# Patient Record
Sex: MALE | Race: WHITE | NOT HISPANIC OR LATINO | Employment: FULL TIME | URBAN - METROPOLITAN AREA
[De-identification: names, ages, dates, MRNs, and addresses within clinical notes are randomized per-mention and may not be internally consistent; named-entity substitution may affect disease eponyms.]

---

## 2023-11-29 ENCOUNTER — APPOINTMENT (OUTPATIENT)
Dept: PRIMARY CARE | Facility: CLINIC | Age: 53
End: 2023-11-29
Payer: COMMERCIAL

## 2023-12-04 PROBLEM — R93.1 AGATSTON CORONARY ARTERY CALCIUM SCORE BETWEEN 200 AND 399: Status: ACTIVE | Noted: 2023-03-05

## 2023-12-04 PROBLEM — Z86.0100 HISTORY OF COLON POLYPS: Status: ACTIVE | Noted: 2019-05-24

## 2023-12-04 PROBLEM — F41.9 ANXIETY: Status: ACTIVE | Noted: 2019-11-12

## 2023-12-04 PROBLEM — E03.9 HYPOTHYROIDISM (ACQUIRED): Status: ACTIVE | Noted: 2019-06-03

## 2023-12-04 PROBLEM — Z86.010 HISTORY OF COLON POLYPS: Status: ACTIVE | Noted: 2019-05-24

## 2023-12-04 PROBLEM — J30.2 SEASONAL ALLERGIC RHINITIS: Status: ACTIVE | Noted: 2019-05-24

## 2023-12-04 PROBLEM — I10 BENIGN ESSENTIAL HYPERTENSION: Status: ACTIVE | Noted: 2019-05-24

## 2023-12-04 PROBLEM — K90.0 CELIAC DISEASE (HHS-HCC): Status: ACTIVE | Noted: 2019-05-24

## 2023-12-04 PROBLEM — I25.10 CORONARY ARTERIOSCLEROSIS: Status: ACTIVE | Noted: 2023-03-05

## 2023-12-04 PROBLEM — K76.0 HEPATIC STEATOSIS: Status: ACTIVE | Noted: 2019-08-21

## 2023-12-04 PROBLEM — R73.01 IMPAIRED FASTING GLUCOSE: Status: ACTIVE | Noted: 2019-06-03

## 2023-12-04 RX ORDER — LISINOPRIL 20 MG/1
20 TABLET ORAL DAILY
COMMUNITY
Start: 2022-12-22 | End: 2023-12-08 | Stop reason: SDUPTHER

## 2023-12-04 RX ORDER — AMLODIPINE BESYLATE 2.5 MG/1
2.5 TABLET ORAL DAILY
COMMUNITY
Start: 2023-01-04 | End: 2023-12-08 | Stop reason: SDUPTHER

## 2023-12-04 RX ORDER — ROSUVASTATIN CALCIUM 10 MG/1
1 TABLET, COATED ORAL NIGHTLY
COMMUNITY
Start: 2023-02-22

## 2023-12-04 RX ORDER — LEVOTHYROXINE SODIUM 88 UG/1
88 TABLET ORAL
COMMUNITY
Start: 2023-06-07

## 2023-12-04 RX ORDER — ASPIRIN 81 MG/1
81 TABLET ORAL DAILY
COMMUNITY

## 2023-12-06 ENCOUNTER — OFFICE VISIT (OUTPATIENT)
Dept: PRIMARY CARE | Facility: CLINIC | Age: 53
End: 2023-12-06
Payer: COMMERCIAL

## 2023-12-06 DIAGNOSIS — E03.9 HYPOTHYROIDISM (ACQUIRED): ICD-10-CM

## 2023-12-06 DIAGNOSIS — E78.2 MIXED HYPERLIPIDEMIA: ICD-10-CM

## 2023-12-06 DIAGNOSIS — R73.01 IMPAIRED FASTING GLUCOSE: ICD-10-CM

## 2023-12-06 DIAGNOSIS — I10 BENIGN ESSENTIAL HYPERTENSION: ICD-10-CM

## 2023-12-06 DIAGNOSIS — Z71.9 ENCOUNTER FOR COUNSELING: Primary | ICD-10-CM

## 2023-12-06 PROCEDURE — UHSMG PR UH SELECT MEET AND GREET: Performed by: INTERNAL MEDICINE

## 2023-12-06 NOTE — PROGRESS NOTES
Patient presents for meet/greet.    Routine labs that are currently due have been ordered and patient will complete if he pursues following in this practice.  Vaccines discussed and recommended    Office visit in the next 1-2 months recommended.    Guille Mccarthy MD

## 2023-12-08 ENCOUNTER — TELEPHONE (OUTPATIENT)
Dept: PRIMARY CARE | Facility: CLINIC | Age: 53
End: 2023-12-08
Payer: COMMERCIAL

## 2023-12-08 DIAGNOSIS — I10 BENIGN ESSENTIAL HYPERTENSION: ICD-10-CM

## 2023-12-08 DIAGNOSIS — Z00.00 WELLNESS EXAMINATION: Primary | ICD-10-CM

## 2023-12-08 RX ORDER — AMLODIPINE BESYLATE 2.5 MG/1
2.5 TABLET ORAL DAILY
Qty: 90 TABLET | Refills: 3 | Status: SHIPPED | OUTPATIENT
Start: 2023-12-08 | End: 2024-04-19

## 2023-12-08 RX ORDER — LISINOPRIL 20 MG/1
20 TABLET ORAL DAILY
Qty: 90 TABLET | Refills: 3 | Status: SHIPPED | OUTPATIENT
Start: 2023-12-08 | End: 2024-04-19

## 2023-12-08 NOTE — TELEPHONE ENCOUNTER
Patient to be scheduled for wellness exam/physical in early February  Fasting lab work has been ordered for completion prior to visit    Guille Mccarthy MD

## 2023-12-11 NOTE — TELEPHONE ENCOUNTER
Voicemail was left for Mr. Hebert to schedule his CPE.  He was also informed to have fasting labs drawn prior to his visit.

## 2024-02-08 ENCOUNTER — LAB (OUTPATIENT)
Dept: LAB | Facility: LAB | Age: 54
End: 2024-02-08
Payer: COMMERCIAL

## 2024-02-08 ENCOUNTER — TELEPHONE (OUTPATIENT)
Dept: PRIMARY CARE | Facility: CLINIC | Age: 54
End: 2024-02-08

## 2024-02-08 DIAGNOSIS — Z00.00 WELLNESS EXAMINATION: ICD-10-CM

## 2024-02-08 DIAGNOSIS — Z00.00 WELLNESS EXAMINATION: Primary | ICD-10-CM

## 2024-02-08 LAB
25(OH)D3 SERPL-MCNC: 33 NG/ML (ref 30–100)
ALBUMIN SERPL BCP-MCNC: 5 G/DL (ref 3.4–5)
ALP SERPL-CCNC: 62 U/L (ref 33–120)
ALT SERPL W P-5'-P-CCNC: 34 U/L (ref 10–52)
ANION GAP SERPL CALC-SCNC: 14 MMOL/L (ref 10–20)
APPEARANCE UR: CLEAR
AST SERPL W P-5'-P-CCNC: 27 U/L (ref 9–39)
BASOPHILS # BLD AUTO: 0.04 X10*3/UL (ref 0–0.1)
BASOPHILS NFR BLD AUTO: 0.7 %
BILIRUB SERPL-MCNC: 0.8 MG/DL (ref 0–1.2)
BILIRUB UR STRIP.AUTO-MCNC: NEGATIVE MG/DL
BUN SERPL-MCNC: 14 MG/DL (ref 6–23)
CALCIUM SERPL-MCNC: 10.5 MG/DL (ref 8.6–10.6)
CHLORIDE SERPL-SCNC: 98 MMOL/L (ref 98–107)
CHOLEST SERPL-MCNC: 175 MG/DL (ref 0–199)
CHOLESTEROL/HDL RATIO: 2.2
CO2 SERPL-SCNC: 30 MMOL/L (ref 21–32)
COLOR UR: COLORLESS
CREAT SERPL-MCNC: 0.84 MG/DL (ref 0.5–1.3)
CRP SERPL HS-MCNC: 1.6 MG/L
EGFRCR SERPLBLD CKD-EPI 2021: >90 ML/MIN/1.73M*2
EOSINOPHIL # BLD AUTO: 0.07 X10*3/UL (ref 0–0.7)
EOSINOPHIL NFR BLD AUTO: 1.2 %
ERYTHROCYTE [DISTWIDTH] IN BLOOD BY AUTOMATED COUNT: 12.1 % (ref 11.5–14.5)
EST. AVERAGE GLUCOSE BLD GHB EST-MCNC: 100 MG/DL
GLUCOSE SERPL-MCNC: 86 MG/DL (ref 74–99)
GLUCOSE UR STRIP.AUTO-MCNC: NORMAL MG/DL
HBA1C MFR BLD: 5.1 %
HCT VFR BLD AUTO: 47.1 % (ref 41–52)
HDLC SERPL-MCNC: 78.6 MG/DL
HGB BLD-MCNC: 15.8 G/DL (ref 13.5–17.5)
IMM GRANULOCYTES # BLD AUTO: 0.03 X10*3/UL (ref 0–0.7)
IMM GRANULOCYTES NFR BLD AUTO: 0.5 % (ref 0–0.9)
KETONES UR STRIP.AUTO-MCNC: NEGATIVE MG/DL
LDLC SERPL CALC-MCNC: 79 MG/DL
LEUKOCYTE ESTERASE UR QL STRIP.AUTO: NEGATIVE
LYMPHOCYTES # BLD AUTO: 1.9 X10*3/UL (ref 1.2–4.8)
LYMPHOCYTES NFR BLD AUTO: 32.9 %
MCH RBC QN AUTO: 31.5 PG (ref 26–34)
MCHC RBC AUTO-ENTMCNC: 33.5 G/DL (ref 32–36)
MCV RBC AUTO: 94 FL (ref 80–100)
MONOCYTES # BLD AUTO: 0.52 X10*3/UL (ref 0.1–1)
MONOCYTES NFR BLD AUTO: 9 %
NEUTROPHILS # BLD AUTO: 3.22 X10*3/UL (ref 1.2–7.7)
NEUTROPHILS NFR BLD AUTO: 55.7 %
NITRITE UR QL STRIP.AUTO: NEGATIVE
NON HDL CHOLESTEROL: 96 MG/DL (ref 0–149)
NRBC BLD-RTO: 0 /100 WBCS (ref 0–0)
PH UR STRIP.AUTO: 6.5 [PH]
PLATELET # BLD AUTO: 230 X10*3/UL (ref 150–450)
POTASSIUM SERPL-SCNC: 4.5 MMOL/L (ref 3.5–5.3)
PROT SERPL-MCNC: 8 G/DL (ref 6.4–8.2)
PROT UR STRIP.AUTO-MCNC: NEGATIVE MG/DL
PSA SERPL-MCNC: 1.4 NG/ML
RBC # BLD AUTO: 5.01 X10*6/UL (ref 4.5–5.9)
RBC # UR STRIP.AUTO: NEGATIVE /UL
SODIUM SERPL-SCNC: 137 MMOL/L (ref 136–145)
SP GR UR STRIP.AUTO: 1
TRIGL SERPL-MCNC: 88 MG/DL (ref 0–149)
TSH SERPL-ACNC: 3.27 MIU/L (ref 0.44–3.98)
UROBILINOGEN UR STRIP.AUTO-MCNC: NORMAL MG/DL
VLDL: 18 MG/DL (ref 0–40)
WBC # BLD AUTO: 5.8 X10*3/UL (ref 4.4–11.3)

## 2024-02-08 PROCEDURE — 80053 COMPREHEN METABOLIC PANEL: CPT

## 2024-02-08 PROCEDURE — 86141 C-REACTIVE PROTEIN HS: CPT

## 2024-02-08 PROCEDURE — 81003 URINALYSIS AUTO W/O SCOPE: CPT

## 2024-02-08 PROCEDURE — 83036 HEMOGLOBIN GLYCOSYLATED A1C: CPT

## 2024-02-08 PROCEDURE — 84153 ASSAY OF PSA TOTAL: CPT

## 2024-02-08 PROCEDURE — 36415 COLL VENOUS BLD VENIPUNCTURE: CPT

## 2024-02-08 PROCEDURE — 80061 LIPID PANEL: CPT

## 2024-02-08 PROCEDURE — 84443 ASSAY THYROID STIM HORMONE: CPT

## 2024-02-08 PROCEDURE — 82306 VITAMIN D 25 HYDROXY: CPT

## 2024-02-08 PROCEDURE — 85025 COMPLETE CBC W/AUTO DIFF WBC: CPT

## 2024-02-08 NOTE — TELEPHONE ENCOUNTER
Gilberto had labs drawn today.  He mentioned that a lipid panel wasn't ordered.  Should this be added on?  Last lipid panel was on 5-9-23.

## 2024-02-09 ENCOUNTER — APPOINTMENT (OUTPATIENT)
Dept: PRIMARY CARE | Facility: CLINIC | Age: 54
End: 2024-02-09
Payer: COMMERCIAL

## 2024-02-12 ENCOUNTER — OFFICE VISIT (OUTPATIENT)
Dept: PRIMARY CARE | Facility: CLINIC | Age: 54
End: 2024-02-12
Payer: COMMERCIAL

## 2024-02-12 VITALS
DIASTOLIC BLOOD PRESSURE: 80 MMHG | HEART RATE: 84 BPM | SYSTOLIC BLOOD PRESSURE: 124 MMHG | OXYGEN SATURATION: 96 % | BODY MASS INDEX: 33.32 KG/M2 | HEIGHT: 71 IN | WEIGHT: 238 LBS

## 2024-02-12 DIAGNOSIS — I25.10 CORONARY ARTERIOSCLEROSIS: ICD-10-CM

## 2024-02-12 DIAGNOSIS — J30.2 SEASONAL ALLERGIC RHINITIS, UNSPECIFIED TRIGGER: ICD-10-CM

## 2024-02-12 DIAGNOSIS — E66.09 CLASS 1 OBESITY DUE TO EXCESS CALORIES WITHOUT SERIOUS COMORBIDITY WITH BODY MASS INDEX (BMI) OF 33.0 TO 33.9 IN ADULT: ICD-10-CM

## 2024-02-12 DIAGNOSIS — Z00.00 WELLNESS EXAMINATION: Primary | ICD-10-CM

## 2024-02-12 DIAGNOSIS — E78.2 MIXED HYPERLIPIDEMIA: ICD-10-CM

## 2024-02-12 DIAGNOSIS — Z78.9 ADEQUATE NUTRITION: ICD-10-CM

## 2024-02-12 DIAGNOSIS — Z23 NEED FOR VACCINATION: ICD-10-CM

## 2024-02-12 DIAGNOSIS — E03.9 HYPOTHYROIDISM (ACQUIRED): ICD-10-CM

## 2024-02-12 DIAGNOSIS — Z80.0 FAMILY HISTORY OF COLON CANCER: ICD-10-CM

## 2024-02-12 DIAGNOSIS — I10 ESSENTIAL HYPERTENSION: ICD-10-CM

## 2024-02-12 PROBLEM — R73.01 IMPAIRED FASTING GLUCOSE: Status: RESOLVED | Noted: 2019-06-03 | Resolved: 2024-02-12

## 2024-02-12 PROBLEM — E66.9 CLASS 1 OBESITY WITHOUT SERIOUS COMORBIDITY IN ADULT: Status: ACTIVE | Noted: 2024-02-12

## 2024-02-12 PROBLEM — E66.811 CLASS 1 OBESITY WITHOUT SERIOUS COMORBIDITY IN ADULT: Status: ACTIVE | Noted: 2024-02-12

## 2024-02-12 PROCEDURE — 3079F DIAST BP 80-89 MM HG: CPT | Performed by: INTERNAL MEDICINE

## 2024-02-12 PROCEDURE — 3074F SYST BP LT 130 MM HG: CPT | Performed by: INTERNAL MEDICINE

## 2024-02-12 PROCEDURE — 90471 IMMUNIZATION ADMIN: CPT | Performed by: INTERNAL MEDICINE

## 2024-02-12 PROCEDURE — 1036F TOBACCO NON-USER: CPT | Performed by: INTERNAL MEDICINE

## 2024-02-12 PROCEDURE — 90686 IIV4 VACC NO PRSV 0.5 ML IM: CPT | Performed by: INTERNAL MEDICINE

## 2024-02-12 PROCEDURE — UHSPHYS PR UH SELECT PHYSICAL: Performed by: INTERNAL MEDICINE

## 2024-02-12 RX ORDER — BISMUTH SUBSALICYLATE 262 MG
1 TABLET,CHEWABLE ORAL DAILY
Qty: 90 TABLET | Refills: 3
Start: 2024-02-12 | End: 2025-02-11

## 2024-02-12 ASSESSMENT — ENCOUNTER SYMPTOMS
RHINORRHEA: 0
DIARRHEA: 0
ABDOMINAL PAIN: 0
HEADACHES: 0
PALPITATIONS: 0
DYSURIA: 0
COUGH: 0
WEAKNESS: 0
LIGHT-HEADEDNESS: 0
ARTHRALGIAS: 0
DIZZINESS: 0
FATIGUE: 0
SHORTNESS OF BREATH: 0
BACK PAIN: 0
CONSTIPATION: 0
WHEEZING: 0

## 2024-02-12 NOTE — PROGRESS NOTES
Subjective   Patient ID: Gilberto Hebert is a 53 y.o. male who presents for Annual Exam.    Wellness exam/physical    Essential hypertension  Home blood pressure readings reported as 120/70-85 (no BP log available for review)  Compliant with amlodipine and lisinopril.  Getting regular exercise.  Moderate alcohol intake averaging 2-3 drinks per day.    Medical obesity (class I, BMI 33.19)  Getting regular exercise, diet optimization discussed.  Limiting alcohol reviewed.  Referral to nutrition discussed and patient agreeable.    Coronary arteriosclerosis  Hyperlipidemia  Active lifestyle without chest pain, shortness of breath, etc.    Hypothyroidism  TSH at goal.  No unintentional weight gain or loss, constipation or diarrhea, heat or cold intolerance.  Currently on levothyroxine 88 mcg daily    Seasonal allergic rhinitis  Mild symptoms, usually in the spring.  Has not recently used any medications.    Family history of colon cancer  Last colonoscopy 2/2021 at which time hyperplastic polyp removed.  Next colonoscopy due in 2/2026 with Dr. Sampson    Health maintenance  Exercise: Walking 30 to 40 minutes daily and tennis twice a week  Colon cancer screening: Up-to-date  Optometry: Upcoming appointment on 2/14  Dentistry: Up-to-date    Social  Occupation: Financial advising  Education: [employer] for undergraduate, [employer] law school   with children  Vacation home in [place]    Follow-up  1.  Office visit in August  (Lab work including BMP and TSH to be completed prior)  2.  Wellness exam/physical in 1 year, on/after 2/12/2025           Review of Systems   Constitutional:  Negative for fatigue.   HENT:  Negative for postnasal drip and rhinorrhea.    Respiratory:  Negative for cough, shortness of breath and wheezing.    Cardiovascular:  Negative for chest pain, palpitations and leg swelling.   Gastrointestinal:  Negative for abdominal pain, constipation and diarrhea.   Genitourinary:  Negative for dysuria and  "urgency.   Musculoskeletal:  Negative for arthralgias and back pain.   Skin:  Negative for rash.   Allergic/Immunologic: Positive for environmental allergies.   Neurological:  Negative for dizziness, weakness, light-headedness and headaches.       Objective   /80 (BP Location: Left arm, Patient Position: Sitting, BP Cuff Size: Adult)   Pulse 84   Ht 1.803 m (5' 11\")   Wt 108 kg (238 lb)   SpO2 96%   BMI 33.19 kg/m²     Physical Exam  Vitals reviewed.   Constitutional:       Appearance: Normal appearance.   HENT:      Head: Normocephalic.      Right Ear: Tympanic membrane normal.      Left Ear: Tympanic membrane normal.      Mouth/Throat:      Mouth: Mucous membranes are moist.   Eyes:      Conjunctiva/sclera: Conjunctivae normal.      Pupils: Pupils are equal, round, and reactive to light.   Neck:      Vascular: No carotid bruit.   Cardiovascular:      Rate and Rhythm: Normal rate and regular rhythm.   Pulmonary:      Effort: Pulmonary effort is normal.      Breath sounds: Normal breath sounds. No rales.   Abdominal:      General: Abdomen is flat. Bowel sounds are normal.      Tenderness: There is no abdominal tenderness.   Genitourinary:     Prostate: Normal.   Musculoskeletal:      Right lower leg: No edema.      Left lower leg: No edema.   Lymphadenopathy:      Cervical: No cervical adenopathy.   Skin:     General: Skin is warm.   Neurological:      General: No focal deficit present.      Mental Status: He is alert and oriented to person, place, and time.   Psychiatric:         Mood and Affect: Mood normal.         Assessment/Plan     Wellness exam/physical  Regular exercise with goal of 120-150 minutes/week recommended  Well-balanced diet rich in fruits, vegetables, fiber, lean protein recommended  Influenza vaccine given today  Other vaccines discussed include:  Second Shingrix vaccine and COVID-19 vaccine (patient plans to receive at pharmacy)  Continued routine follow-up with optometry (appointment " scheduled on 2/14) and dentistry recommended    Essential hypertension  Stable.  Continue amlodipine 2.5 mg daily and lisinopril 20 mg daily  Low-salt and Mediterranean-type diet recommended  Regular exercise and limiting alcohol intake recommended  Continue home blood pressure monitoring.  Bring readings and device to next visit  Goal for blood pressure is under 130/80    Medical obesity (class I, BMI 33.19)  Low carbohydrate/low sugar diet, limited alcohol intake, and regular exercise recommended  Weight loss of 25-30 pounds recommended  Referral to nutrition for consultation    Coronary arteriosclerosis  Hyperlipidemia  Regular exercise, low-fat/high-fiber Mediterranean type diet recommended  Continue rosuvastatin 10 mg daily and aspirin 81 mg daily    Hypothyroidism  Continue levothyroxine 88 mcg daily    Seasonal allergic rhinitis  Over-the-counter remedies such as Zyrtec 10 mg daily or Flonase 2 sprays in each nostril daily as needed can be used    Family history of colon cancer  Next screening colonoscopy due in 2/2026    Follow-up  1.  Office visit in August  (Lab work including BMP and TSH to be completed prior)  2.  Wellness exam/physical in 1 year, on/after 2/12/2025    Guille Mccarthy MD

## 2024-02-12 NOTE — PATIENT INSTRUCTIONS
Wellness exam/physical  Regular exercise with goal of 120-150 minutes/week recommended  Well-balanced diet rich in fruits, vegetables, fiber, lean protein recommended  Influenza vaccine given today  Other vaccines discussed include:  Second Shingrix vaccine and COVID-19 vaccine (patient plans to receive at pharmacy)  Continued routine follow-up with optometry (appointment scheduled on 2/14) and dentistry recommended    Essential hypertension  Stable.  Continue amlodipine 2.5 mg daily and lisinopril 20 mg daily  Low-salt and Mediterranean-type diet recommended  Regular exercise and limiting alcohol intake recommended  Continue home blood pressure monitoring.  Bring readings and device to next visit  Goal for blood pressure is under 130/80    Medical obesity (class I, BMI 33.19)  Low carbohydrate/low sugar diet, limited alcohol intake, and regular exercise recommended  Weight loss of 25-30 pounds recommended  Referral to nutrition for consultation    Coronary arteriosclerosis  Hyperlipidemia  Regular exercise, low-fat/high-fiber Mediterranean type diet recommended  Continue rosuvastatin 10 mg daily and aspirin 81 mg daily    Hypothyroidism  Continue levothyroxine 88 mcg daily    Seasonal allergic rhinitis  Over-the-counter remedies such as Zyrtec 10 mg daily or Flonase 2 sprays in each nostril daily as needed can be used    Family history of colon cancer  Next screening colonoscopy due in 2/2026    Follow-up  1.  Office visit in August  (Lab work including BMP and TSH to be completed prior)  2.  Wellness exam/physical in 1 year, on/after 2/12/2025

## 2024-04-12 ENCOUNTER — TELEMEDICINE CLINICAL SUPPORT (OUTPATIENT)
Dept: PRIMARY CARE | Facility: CLINIC | Age: 54
End: 2024-04-12
Payer: COMMERCIAL

## 2024-04-12 DIAGNOSIS — E78.2 MIXED HYPERLIPIDEMIA: ICD-10-CM

## 2024-04-12 DIAGNOSIS — E66.09 CLASS 1 OBESITY DUE TO EXCESS CALORIES WITHOUT SERIOUS COMORBIDITY WITH BODY MASS INDEX (BMI) OF 33.0 TO 33.9 IN ADULT: ICD-10-CM

## 2024-04-12 PROCEDURE — NUTCO NUTRITION CONSULTATION: Performed by: DIETITIAN, REGISTERED

## 2024-04-12 NOTE — PROGRESS NOTES
Reason for Nutrition Visit:  It was a pleasure meeting Mr. Hebert today to discuss diet and nutrition as part of the  [facility] program.    1. Mixed hyperlipidemia  Referral to Nutrition Services      2. Class 1 obesity due to excess calories without serious comorbidity with body mass index (BMI) of 33.0 to 33.9 in adult  Referral to Nutrition Services           Medication Documentation Review Audit       Reviewed by Kiara Shields CMA (Medical Assistant) on 02/12/24 at 1013      Medication Order Taking? Sig Documenting Provider Last Dose Status   amLODIPine (Norvasc) 2.5 mg tablet [id number] Yes Take 1 tablet (2.5 mg) by mouth once daily. Guille Mccarthy MD Taking Active   aspirin 81 mg EC tablet [id number] Yes Take 1 tablet (81 mg) by mouth once daily. Historical Provider, MD Taking Active   levothyroxine (Synthroid, Levoxyl) 88 mcg tablet [id number] Yes Take 1 tablet (88 mcg) by mouth once daily in the morning. Take before meals. Historical Provider, MD Taking Active   lisinopril 20 mg tablet [id number] Yes Take 1 tablet (20 mg) by mouth once daily. Guille Mccarthy MD Taking Active   rosuvastatin (Crestor) 10 mg tablet [id number] Yes Take 1 tablet (10 mg) by mouth once daily at bedtime. Historical Provider, MD Taking Active                     Past Medical Hx:  Patient Active Problem List   Diagnosis    Anxiety    Essential hypertension    Celiac disease (HHS-HCC)    Agatston coronary artery calcium score between 200 and 399    Coronary arteriosclerosis    Hepatic steatosis    History of colon polyps    Mixed hyperlipidemia    Hypothyroidism (acquired)    Seasonal allergic rhinitis    Class 1 obesity without serious comorbidity with body mass index (BMI) of 33.0 to 33.9 in adult    Family history of colon cancer        Weight change:  His current weight is 238lbs.  He has been at this weight for the past 5 years.  He would like to lost ~20lbs and get his weight closer to 220lbs.    Significant Weight Change:  No    Lab Results   Component Value Date    HGBA1C 5.1 02/08/2024    CHOL 175 02/08/2024    LDLCALC 79 02/08/2024    TRIG 88 02/08/2024    HDL 78.6 02/08/2024        Food and Nutrition Hx:  Reports eating 3 meals a day.  He will snack on almonds and string cheese in the afternoons.  He eats Cheerios and 2 servings of fruit for breakfast, such as orange and banana.  He works from home, but eats out for lunch frequently about 3 times a week and he will eat out for dinner twice a week.  He is eating two servings of fish a week.      24 Diet Recall:  Breakfast: Cheerios with skim milk, tangerine, banana  Lunch: turkey and Swiss cheese roll ups, corn chips, tangerine  Snack: almonds (10-12)  Dinner: salmon (8oz), spinach salad with strawberries, almonds, mushrooms, onions, Italian dressing    Beverages: water-100-120oz a day; alcohol-2-3 drinks a night, vodka soda    Allergies: None  Intolerance: Gluten He avoids gluten.  As a child he was diagnosed with celiac.      GI Symptoms : None He has a bowel movement everyday.    Exercise: Walking 30 minutes everyday (7 days a week); tennis twice a week (plays more when he is in [place]).  He does not incorporate strength training.     Sleep duration/quality : 7 hours a night.  No issues falling asleep.  Occasionally he will get up once to use the bathroom, but he falls back to sleep easily.     Supplements: Multivitamin daily    Cravings: None  Energy Levels: High    Estimated Nutrient Needs:    Calories for Weight Maintenance: 2527 ([place] [address] Jeor x 1.3 activity factor)  Calories for Weight Loss: 2000 calories a day  Protein Needs: 154g/day (0.7g/lb DBW, 220lbs)    Nutrition Diagnosis:    Diagnosis Statement 1:  Diagnosis Status: New  Diagnosis : Obese related to  food- and nutrition-related knowledge deficit  as evidenced by  BMI of 33, reports of consuming some high calorie foods and large portions at times.    Diagnosis Statement 2:  Diagnosis Status: New  Diagnosis :  Inadequate fiber intake  related to food and nutrition related knowledge deficit concerning desirable quantities of fiber as evidenced by  food recall showing he is not meeting the recommended 35-40g fiber per day    Diagnosis Statement 3:   Diagnosis Status: New  Diagnosis : Inadequate protein intake  related to  food- and nutrition-related knowledge deficit regarding appropriate protein intake  as evidenced by  food recall showing he is not meeting the recommended 150g protein per day    Nutrition Interventions:  Decreased Carbohydrate Diet, Increased Fiber Diet, and Increased Protein Diet      Nutrition Goals:  Nutrition Goals : Initiate Exercise Regimen  Reduce Kcal Intake  Weight Loss  Adequate protein intake  Adequate fiber intake    Nutrition Recommendations:    1) To promote weight loss, which should also keep lipid profile and glucose tolerance in line, try to stick to a 4109-0908 calorie a day meal plan.  Begin to track your food intake using an timbo such as My Fitness Pal, Zazzy, or [facility], paying specific attention to calories, protein, and fiber.     2) To help create well balanced meals while being mindful of portion sizes, use the plate model for portioning out your meals.  Fill 1/2 of your plate with non-starchy vegetables, 1/4 of your plate with lean protein (~6-7oz per meal), and 1/4 of your plate with complex carbohydrates/whole grains.  Each meal should contain the following 3 components: protein + fiber + healthy fats.    3) Aim for 35-40g fiber daily.  One way to help you reach this goal is to include non-starchy vegetables with both lunch and dinner (at least 1-2 cups).  Be sure to include a wide variety of colorful vegetables throughout the week.  Also, be sure to use brown/wild rice, quinoa, oats, beans, lentils, starchy vegetables-potatoes, sweet potatoes, corn, peas, winter squash and limit/avoid white, processed carbohydrates (white bread, white pasta, white rice, etc).    4) Choose  3 out of 5 of the following daily to help you reach your daily fiber goals:  -1 cup berries (4-5g fiber)  -1/2 cup beans (7g fiber)  -1/4 cup nuts (5g fiber)  -1/3 avocado (4g fiber)  -3 cups greens (5g fiber)    5) Ensure you are getting adequate amounts of protein consistently throughout the day.  Your daily protein goal is 150g.  Aim for 40-50g per meal and include 10-15g protein at snacks.    6) Options for breakfast to help you increase protein and fiber at this meal:  -low-fat cottage cheese + fruit + handful of nuts  -Greek yogurt + Brittnee Crunch cereal + fruit + nuts  -OWYN Pro Elite pre-made protein drink + fruit + nuts  -oatmeal + 1.5-2 scoops protein powder (recommended brand: [employer] Sonicsuvani plant protein powder) + fruit + nuts/seeds  -2-3 eggs + chicken sausage + veggies + avocado    7) High quality protein bars to keep on hand for a snack option: Aloha, No Cow    8) Incorporate a Vitamin D3 supplement daily, 2000IUs. Take this with food to enhance absorption.    9) Incorporate strength training 2-3 days a week.

## 2024-04-19 DIAGNOSIS — I10 BENIGN ESSENTIAL HYPERTENSION: ICD-10-CM

## 2024-04-19 RX ORDER — LISINOPRIL 20 MG/1
20 TABLET ORAL DAILY
Qty: 90 TABLET | Refills: 3 | Status: SHIPPED | OUTPATIENT
Start: 2024-04-19

## 2024-04-19 RX ORDER — AMLODIPINE BESYLATE 2.5 MG/1
2.5 TABLET ORAL DAILY
Qty: 90 TABLET | Refills: 3 | Status: SHIPPED | OUTPATIENT
Start: 2024-04-19

## 2024-06-06 ENCOUNTER — TELEPHONE (OUTPATIENT)
Dept: PRIMARY CARE | Facility: CLINIC | Age: 54
End: 2024-06-06
Payer: COMMERCIAL

## 2024-06-06 ENCOUNTER — APPOINTMENT (OUTPATIENT)
Dept: RADIOLOGY | Facility: HOSPITAL | Age: 54
End: 2024-06-06
Payer: COMMERCIAL

## 2024-06-06 ENCOUNTER — HOSPITAL ENCOUNTER (EMERGENCY)
Facility: HOSPITAL | Age: 54
Discharge: HOME | End: 2024-06-06
Attending: INTERNAL MEDICINE
Payer: COMMERCIAL

## 2024-06-06 ENCOUNTER — APPOINTMENT (OUTPATIENT)
Dept: CARDIOLOGY | Facility: HOSPITAL | Age: 54
End: 2024-06-06
Payer: COMMERCIAL

## 2024-06-06 VITALS
HEIGHT: 71 IN | RESPIRATION RATE: 18 BRPM | SYSTOLIC BLOOD PRESSURE: 132 MMHG | OXYGEN SATURATION: 100 % | DIASTOLIC BLOOD PRESSURE: 78 MMHG | HEART RATE: 82 BPM | TEMPERATURE: 98.1 F | WEIGHT: 234 LBS | BODY MASS INDEX: 32.76 KG/M2

## 2024-06-06 DIAGNOSIS — I15.9 SECONDARY HYPERTENSION: Primary | ICD-10-CM

## 2024-06-06 DIAGNOSIS — F10.939 ALCOHOL WITHDRAWAL SYNDROME WITH COMPLICATION (MULTI): ICD-10-CM

## 2024-06-06 DIAGNOSIS — R25.1 TREMOR: ICD-10-CM

## 2024-06-06 LAB
ALBUMIN SERPL BCP-MCNC: 5 G/DL (ref 3.4–5)
ALP SERPL-CCNC: 67 U/L (ref 33–120)
ALT SERPL W P-5'-P-CCNC: 44 U/L (ref 10–52)
ANION GAP SERPL CALC-SCNC: 16 MMOL/L (ref 10–20)
AST SERPL W P-5'-P-CCNC: 31 U/L (ref 9–39)
BASOPHILS # BLD AUTO: 0.03 X10*3/UL (ref 0–0.1)
BASOPHILS NFR BLD AUTO: 0.4 %
BILIRUB SERPL-MCNC: 0.8 MG/DL (ref 0–1.2)
BNP SERPL-MCNC: 15 PG/ML (ref 0–99)
BUN SERPL-MCNC: 13 MG/DL (ref 6–23)
CALCIUM SERPL-MCNC: 9.6 MG/DL (ref 8.6–10.3)
CARDIAC TROPONIN I PNL SERPL HS: 3 NG/L (ref 0–20)
CARDIAC TROPONIN I PNL SERPL HS: <3 NG/L (ref 0–20)
CHLORIDE SERPL-SCNC: 99 MMOL/L (ref 98–107)
CO2 SERPL-SCNC: 23 MMOL/L (ref 21–32)
CREAT SERPL-MCNC: 0.78 MG/DL (ref 0.5–1.3)
EGFRCR SERPLBLD CKD-EPI 2021: >90 ML/MIN/1.73M*2
EOSINOPHIL # BLD AUTO: 0.05 X10*3/UL (ref 0–0.7)
EOSINOPHIL NFR BLD AUTO: 0.7 %
ERYTHROCYTE [DISTWIDTH] IN BLOOD BY AUTOMATED COUNT: 11.9 % (ref 11.5–14.5)
GLUCOSE SERPL-MCNC: 100 MG/DL (ref 74–99)
HCT VFR BLD AUTO: 48.4 % (ref 41–52)
HGB BLD-MCNC: 16.5 G/DL (ref 13.5–17.5)
IMM GRANULOCYTES # BLD AUTO: 0.03 X10*3/UL (ref 0–0.7)
IMM GRANULOCYTES NFR BLD AUTO: 0.4 % (ref 0–0.9)
LYMPHOCYTES # BLD AUTO: 1.86 X10*3/UL (ref 1.2–4.8)
LYMPHOCYTES NFR BLD AUTO: 25.2 %
MAGNESIUM SERPL-MCNC: 2.1 MG/DL (ref 1.6–2.4)
MCH RBC QN AUTO: 31.6 PG (ref 26–34)
MCHC RBC AUTO-ENTMCNC: 34.1 G/DL (ref 32–36)
MCV RBC AUTO: 93 FL (ref 80–100)
MONOCYTES # BLD AUTO: 0.67 X10*3/UL (ref 0.1–1)
MONOCYTES NFR BLD AUTO: 9.1 %
NEUTROPHILS # BLD AUTO: 4.73 X10*3/UL (ref 1.2–7.7)
NEUTROPHILS NFR BLD AUTO: 64.2 %
NRBC BLD-RTO: 0 /100 WBCS (ref 0–0)
PLATELET # BLD AUTO: 228 X10*3/UL (ref 150–450)
POTASSIUM SERPL-SCNC: 4.2 MMOL/L (ref 3.5–5.3)
PROT SERPL-MCNC: 7.8 G/DL (ref 6.4–8.2)
RBC # BLD AUTO: 5.22 X10*6/UL (ref 4.5–5.9)
SODIUM SERPL-SCNC: 134 MMOL/L (ref 136–145)
TSH SERPL-ACNC: 3.24 MIU/L (ref 0.44–3.98)
WBC # BLD AUTO: 7.4 X10*3/UL (ref 4.4–11.3)

## 2024-06-06 PROCEDURE — 36415 COLL VENOUS BLD VENIPUNCTURE: CPT | Performed by: INTERNAL MEDICINE

## 2024-06-06 PROCEDURE — 84484 ASSAY OF TROPONIN QUANT: CPT | Performed by: INTERNAL MEDICINE

## 2024-06-06 PROCEDURE — 71046 X-RAY EXAM CHEST 2 VIEWS: CPT

## 2024-06-06 PROCEDURE — 80053 COMPREHEN METABOLIC PANEL: CPT | Performed by: INTERNAL MEDICINE

## 2024-06-06 PROCEDURE — 2500000004 HC RX 250 GENERAL PHARMACY W/ HCPCS (ALT 636 FOR OP/ED): Performed by: INTERNAL MEDICINE

## 2024-06-06 PROCEDURE — 84443 ASSAY THYROID STIM HORMONE: CPT | Performed by: INTERNAL MEDICINE

## 2024-06-06 PROCEDURE — 99283 EMERGENCY DEPT VISIT LOW MDM: CPT | Mod: 25

## 2024-06-06 PROCEDURE — 85025 COMPLETE CBC W/AUTO DIFF WBC: CPT | Performed by: INTERNAL MEDICINE

## 2024-06-06 PROCEDURE — 93005 ELECTROCARDIOGRAM TRACING: CPT

## 2024-06-06 PROCEDURE — 71046 X-RAY EXAM CHEST 2 VIEWS: CPT | Performed by: RADIOLOGY

## 2024-06-06 PROCEDURE — 2500000002 HC RX 250 W HCPCS SELF ADMINISTERED DRUGS (ALT 637 FOR MEDICARE OP, ALT 636 FOR OP/ED): Performed by: INTERNAL MEDICINE

## 2024-06-06 PROCEDURE — 83735 ASSAY OF MAGNESIUM: CPT | Performed by: INTERNAL MEDICINE

## 2024-06-06 PROCEDURE — 83880 ASSAY OF NATRIURETIC PEPTIDE: CPT | Performed by: INTERNAL MEDICINE

## 2024-06-06 RX ORDER — DIAZEPAM 5 MG/1
10 TABLET ORAL EVERY 2 HOUR PRN
Status: DISCONTINUED | OUTPATIENT
Start: 2024-06-06 | End: 2024-06-06 | Stop reason: HOSPADM

## 2024-06-06 RX ORDER — DIAZEPAM 5 MG/1
10 TABLET ORAL ONCE
Status: COMPLETED | OUTPATIENT
Start: 2024-06-06 | End: 2024-06-06

## 2024-06-06 RX ADMIN — SODIUM CHLORIDE 1000 ML: 9 INJECTION, SOLUTION INTRAVENOUS at 17:12

## 2024-06-06 RX ADMIN — DIAZEPAM 10 MG: 5 TABLET ORAL at 16:19

## 2024-06-06 ASSESSMENT — LIFESTYLE VARIABLES
NAUSEA AND VOMITING: NO NAUSEA AND NO VOMITING
ORIENTATION AND CLOUDING OF SENSORIUM: ORIENTED AND CAN DO SERIAL ADDITIONS
ANXIETY: MILDLY ANXIOUS
AUDITORY DISTURBANCES: NOT PRESENT
ORIENTATION AND CLOUDING OF SENSORIUM: ORIENTED AND CAN DO SERIAL ADDITIONS
HEADACHE, FULLNESS IN HEAD: NOT PRESENT
TREMOR: 2
BLOOD PRESSURE: 126/89
PAROXYSMAL SWEATS: BARELY PERCEPTIBLE SWEATING, PALMS MOIST
TOTAL SCORE: 1
VISUAL DISTURBANCES: NOT PRESENT
TOTAL SCORE: 3
PULSE: 77
ANXIETY: MILDLY ANXIOUS
PAROXYSMAL SWEATS: NO SWEAT VISIBLE
AUDITORY DISTURBANCES: NOT PRESENT
AUDITORY DISTURBANCES: NOT PRESENT
NAUSEA AND VOMITING: NO NAUSEA AND NO VOMITING
VISUAL DISTURBANCES: NOT PRESENT
HEADACHE, FULLNESS IN HEAD: NOT PRESENT
PULSE: 79
ANXIETY: NO ANXIETY, AT EASE
TOTAL SCORE: 4
TREMOR: 2
HEADACHE, FULLNESS IN HEAD: NOT PRESENT
PAROXYSMAL SWEATS: BARELY PERCEPTIBLE SWEATING, PALMS MOIST
ORIENTATION AND CLOUDING OF SENSORIUM: ORIENTED AND CAN DO SERIAL ADDITIONS
VISUAL DISTURBANCES: NOT PRESENT
NAUSEA AND VOMITING: NO NAUSEA AND NO VOMITING
TREMOR: NO TREMOR
AGITATION: NORMAL ACTIVITY

## 2024-06-06 ASSESSMENT — COLUMBIA-SUICIDE SEVERITY RATING SCALE - C-SSRS
2. HAVE YOU ACTUALLY HAD ANY THOUGHTS OF KILLING YOURSELF?: NO
6. HAVE YOU EVER DONE ANYTHING, STARTED TO DO ANYTHING, OR PREPARED TO DO ANYTHING TO END YOUR LIFE?: NO
1. IN THE PAST MONTH, HAVE YOU WISHED YOU WERE DEAD OR WISHED YOU COULD GO TO SLEEP AND NOT WAKE UP?: NO

## 2024-06-06 ASSESSMENT — PAIN SCALES - GENERAL
PAINLEVEL_OUTOF10: 0 - NO PAIN

## 2024-06-06 ASSESSMENT — PAIN - FUNCTIONAL ASSESSMENT
PAIN_FUNCTIONAL_ASSESSMENT: 0-10
PAIN_FUNCTIONAL_ASSESSMENT: 0-10

## 2024-06-06 NOTE — ED PROVIDER NOTES
HPI     CC: Hypertension and Dizziness     HPI: Gilberto Hebert is a 53 y.o. male with a history of HTN, elevated BMI, hypothyroidism, daily EtOH use, presents with lightheadedness/tremulousness and concern about his blood pressure.  He started to feel lightheaded and tremulous around 9:45 AM.  He was concerned that his blood pressure was elevated so he came to the ED.  He did not check it at home.  He states that he has felt this way in the past when his blood pressure was high.  He denies headache, chest pain, shortness of breath, abdominal pain, nausea, vomiting, diarrhea, fever, chills, or other symptoms.  He does drink three 1.5 ounce shots of liquor a night and has never had withdrawal symptoms before.  He has interest in cutting down but is not interested in speaking with [facility] currently.  He took his blood pressure meds this morning, is not sure the names.  He is a patient of Dr. Mccarthy.      ROS: 10-point review of systems was performed and is otherwise negative except as noted in HPI.    Limitations to history: N/A  46  Independent Historians: N/A     External Records Reviewed: Outpatient notes in EMR    Past Medical History: Noncontributory except per HPI     Past Surgical History: Noncontributory except per HPI     Family History: Reviewed and noncontributory     Social History:  Denies tobacco.  EtOH as above. Denies illicit drugs.    Social Determinants Affecting Care: N/A    No Known Allergies    Home Meds:   Current Outpatient Medications   Medication Instructions    amLODIPine (NORVASC) 2.5 mg, oral, Daily    aspirin 81 mg, oral, Daily    levothyroxine (SYNTHROID, LEVOXYL) 88 mcg, oral, Daily before breakfast    lisinopril 20 mg, oral, Daily    multivitamin tablet 1 tablet, oral, Daily    rosuvastatin (Crestor) 10 mg tablet 1 tablet, oral, Nightly        Physical Exam     ED Triage Vitals [06/06/24 1529]   Temperature Heart Rate Respirations BP   36.7 °C (98.1 °F) 97 20 (!) 189/105      Pulse Ox  "Temp Source Heart Rate Source Patient Position   98 % Oral Monitor --      BP Location FiO2 (%)     -- --         Heart Rate:  [77-97]   Temperature:  [36.7 °C (98.1 °F)]   Respirations:  [17-20]   BP: (126-189)/()   Height:  [180.3 cm (5' 11\")]   Weight:  [106 kg (234 lb)]   Pulse Ox:  [94 %-98 %]      Physical Exam  Vitals and nursing note reviewed.     CONSTITUTIONAL: Well appearing, well nourished, in no acute distress.  Not diaphoretic.  HENMT: Head atraumatic. Airway patent. Nasal mucosa clear. Mouth with normal mucosa, clear oropharynx. Uvula midline. Neck supple.    EYES: Clear bilaterally, pupils equally round and reactive to light.   CARDIOVASCULAR: Normal rate, regular rhythm.  Heart sounds S1, S2.  No murmurs, rubs or gallops. Normal pulses. Capillary refill < 2 sec.   RESPIRATORY: No increased work of breathing. Breath sounds clear and equal bilaterally.  GASTROINTESTINAL: Abdomen soft, non-distended, non-tender. No rebound, no guarding. Normal bowel sounds. No palpable masses.  GENITOURINARY:  No CVA tenderness.  MUSCULOSKELETAL: Spine appears normal, range of motion is not limited, no muscle or joint tenderness. No edema.   NEUROLOGICAL: AAO x3.  Mild resting tremor. CN II-XII intact. 5/5 strength and SILT UEs/LEs. FTN intact.   SKIN: Warm, dry and intact. No rash or notable lesions.  PSYCHIATRIC: Normal mood and affect.  HEME/LYMPH: No adenopathy or splenomegaly.    Diagnostic Results      ECG: ECGs read and interpreted by me. See ED Course, below, for interpretation.    Labs Reviewed   COMPREHENSIVE METABOLIC PANEL - Abnormal       Result Value    Glucose 100 (*)     Sodium 134 (*)     Potassium 4.2      Chloride 99      Bicarbonate 23      Anion Gap 16      Urea Nitrogen 13      Creatinine 0.78      eGFR >90      Calcium 9.6      Albumin 5.0      Alkaline Phosphatase 67      Total Protein 7.8      AST 31      Bilirubin, Total 0.8      ALT 44     MAGNESIUM - Normal    Magnesium 2.10     B-TYPE " NATRIURETIC PEPTIDE - Normal    BNP 15      Narrative:        <100 pg/mL - Heart failure unlikely  100-299 pg/mL - Intermediate probability of acute heart                  failure exacerbation. Correlate with clinical                  context and patient history.    >=300 pg/mL - Heart Failure likely. Correlate with clinical                  context and patient history.    BNP testing is performed using different testing methodology at [facility] than at other [facility]. Direct result comparisons should only be made within the same method.      SERIAL TROPONIN-INITIAL - Normal    Troponin I, High Sensitivity 3      Narrative:     Less than 99th percentile of normal range cutoff-  Female and children under 18 years old <14 ng/L; Male <21 ng/L: Negative  Repeat testing should be performed if clinically indicated.     Female and children under 18 years old 14-50 ng/L; Male 21-50 ng/L:  Consistent with possible cardiac damage and possible increased clinical   risk. Serial measurements may help to assess extent of myocardial damage.     >50 ng/L: Consistent with cardiac damage, increased clinical risk and  myocardial infarction. Serial measurements may help assess extent of   myocardial damage.      NOTE: Children less than 1 year old may have higher baseline troponin   levels and results should be interpreted in conjunction with the overall   clinical context.     NOTE: Troponin I testing is performed using a different   testing methodology at [facility] than at other   [facility]. Direct result comparisons should only   be made within the same method.   SERIAL TROPONIN, 1 HOUR - Normal    Troponin I, High Sensitivity <3      Narrative:     Less than 99th percentile of normal range cutoff-  Female and children under 18 years old <14 ng/L; Male <21 ng/L: Negative  Repeat testing should be performed if clinically indicated.     Female and children under 18 years old 14-50 ng/L;  Male 21-50 ng/L:  Consistent with possible cardiac damage and possible increased clinical   risk. Serial measurements may help to assess extent of myocardial damage.     >50 ng/L: Consistent with cardiac damage, increased clinical risk and  myocardial infarction. Serial measurements may help assess extent of   myocardial damage.      NOTE: Children less than 1 year old may have higher baseline troponin   levels and results should be interpreted in conjunction with the overall   clinical context.     NOTE: Troponin I testing is performed using a different   testing methodology at [facility] than at other   [facility] hospitals. Direct result comparisons should only   be made within the same method.   TSH WITH REFLEX TO FREE T4 IF ABNORMAL - Normal    Thyroid Stimulating Hormone 3.24      Narrative:     TSH testing is performed using different testing methodology at [facility] than at other [facility]. Direct result comparisons should only be made within the same method.     CBC WITH AUTO DIFFERENTIAL    WBC 7.4      nRBC 0.0      RBC 5.22      Hemoglobin 16.5      Hematocrit 48.4      MCV 93      MCH 31.6      MCHC 34.1      RDW 11.9      Platelets 228      Neutrophils % 64.2      Immature Granulocytes %, Automated 0.4      Lymphocytes % 25.2      Monocytes % 9.1      Eosinophils % 0.7      Basophils % 0.4      Neutrophils Absolute 4.73      Immature Granulocytes Absolute, Automated 0.03      Lymphocytes Absolute 1.86      Monocytes Absolute 0.67      Eosinophils Absolute 0.05      Basophils Absolute 0.03     TROPONIN SERIES- (INITIAL, 1 HR)    Narrative:     The following orders were created for panel order Troponin I Series, High Sensitivity (0, 1 HR).  Procedure                               Abnormality         Status                     ---------                               -----------         ------                     Troponin I, High Sensiti...[[id number]]  Normal               Final result               Troponin, High Sensitivi...[[id number]]  Normal              Final result                 Please view results for these tests on the individual orders.         XR chest 2 views   Final Result   Normal chest radiographs.        Signed by: Maximilian Elliott 6/6/2024 4:33 PM   Dictation workstation:   [id number]                    [place] Coma Scale Score: 15                  Procedure  Procedures    ED Course & MDM   Assessment/Plan:   Gilberto Hebert is a 53 y.o. male with a history of HTN, elevated BMI, hypothyroidism, daily EtOH use, presents with lightheadedness/tremulousness and concern about his blood pressure.  He is a daily drinker raising the question of possible mild alcohol withdrawal, CIWA score is low at 3, notable mainly for baseline tremor and mild anxiety.  He also could be having some symptomatic hypertension.  He is notably hypertensive 189/105 on presentation.  Will need to rule out major infectious, metabolic, endocrine derangement.  Workup was initiated with ECG, labs, chest x-ray.  Treatment was initiated with 10 mg p.o. diazepam. See below for details of ED course and ultimate disposition.    Medications   diazePAM (Valium) tablet 10 mg (has no administration in time range)   diazePAM (Valium) tablet 10 mg (10 mg oral Given 6/6/24 1619)   sodium chloride 0.9 % bolus 1,000 mL (0 mL intravenous Stopped 6/6/24 1742)        ED Course as of 06/06/24 1821   Thu Jun 06, 2024   1544 ECG read interpreted by me.  Normal sinus rhythm, rate 94.  Normal axis.  Normal intervals.  No ST or T wave derangements. [CG]   1636 Normal CXR [CG]   1636 Labs are notable for normal CBC, CMP with mild hyponatremia 134 otherwise unremarkable, normal troponin and magnesium, normal BNP [CG]   1721 Repeat /80 [CG]   1756 2nd troponin normal. TSH normal.  [CG]   1820 Patient feels completely better.  We talked extensively about the possibility of alcohol withdrawal or anxiety as the etiology for  his symptoms given his significant improvement with a dose of Valium.  He may be going to very mild alcohol withdrawal during the day when he stops drinking at night.  He would like to cut back but is not interested in Thrive or Librium taper at this time, would like to further discuss with his physician.  He was advised on the dangers of quitting alcohol without assistance and understands this.  Patient was discharged home with anticipatory guidance and strict return precautions. [CG]      ED Course User Index  [CG] Lesly Walton MD         Diagnoses as of 06/06/24 1821   Secondary hypertension   Tremor   Alcohol withdrawal syndrome with complication (Multi)       Disposition:   DISCHARGE.  The patient was discharged with both verbal and written anticipatory guidance and strict return precautions. Discharge diagnosis, instructions and plan were discussed and understood. I emphasized the importance of following up with their primary care provider within 24-48 hours and with any referrals in the timeframe recommended. At the time of discharge the patient was comfortable and was in no apparent distress. Patient is aware of diagnostic uncertainty and was notified though testing is negative here, there is a very small chance that pathology may be missed.  The patient understands these risks and the patient/family understood to call 911 or return immediately to the emergency department if the symptoms worsen or if they have any additional concerns.      FOLLOW UP  Primary care provider in 1-2 days.      ED Prescriptions    None         Lesly Walton MD  EM/IM/Peds    This note was dictated by speech recognition. Minor errors in transcription may be present.     Lesly Walton MD  06/06/24 1821

## 2024-06-06 NOTE — TELEPHONE ENCOUNTER
Gilberto went to [facility] for hypertension, dizziness.  /105.  Please call [phone].  Thanks

## 2024-06-06 NOTE — TELEPHONE ENCOUNTER
Patient is currently in the ED and I have reviewed current information.  I have left a voicemail for patient and will try to contact him again.    Guille Mccarthy MD

## 2024-06-06 NOTE — ED TRIAGE NOTES
"Pt presents to ED for HTN and dizziness since today and compliant with BP meds. Denies headache, blurred vision, abd pain, N/V/D, CP/SOB. Pt reports mid chest \"heart fluttering\" and clamminess. Pt reports daily intake of 3 drinks of liquor per day with last drink yesterday. Denies DTs or withdrawal hx.   "

## 2024-06-06 NOTE — DISCHARGE INSTRUCTIONS
You were seen today for elevated blood pressure and tremulousness.  We talked about your reassuring workup and the possibility of very mild alcohol withdrawal as the etiology of your symptoms.  We offered you medications to try to help you safely wean off of alcohol at home but you would like to discuss this further with your physician.  Please return to the ED for any new or worsening symptoms.  Continue your current blood pressure regimen.  Your evaluation was not concerning for an emergency at this time. Please see the attached information sheet for information about your condition, how to care for your condition at home, and reasons to return to the emergency department. Take any prescriptions written today as prescribed. You should call your primary care provider within 24 hours to tell them about today's visit, including any new medications or medication changes, as he or she may want to see you in the office for further evaluation. If you do not have a primary care provider, call  [phone] for an appointment. We offer in-person office visits as well as virtual options. Please do not hesitate to call  [phone] or return to the emergency department with any new or unresolved concerns or symptoms. Thank you for choosing [facility] for your care.

## 2024-06-08 NOTE — TELEPHONE ENCOUNTER
Patient was discharged from the emergency department  Blood pressure improved with 10 mg dose of diazepam.  Consideration for possible anxiety attack or alcohol withdrawal discussed with patient per ED provider.  Discharge blood pressure is 132/80.  No additional medications for hypertension added.    I have left a message on patient's self identified voicemail recommending patient monitor blood pressures and bring readings as well as blood pressure device to next office visit.    Office visit recommended this coming week.  Patient has been advised to contact office with update on how he is currently doing.    Guille Mccarthy MD

## 2024-06-12 ENCOUNTER — OFFICE VISIT (OUTPATIENT)
Dept: PRIMARY CARE | Facility: CLINIC | Age: 54
End: 2024-06-12
Payer: COMMERCIAL

## 2024-06-12 VITALS
SYSTOLIC BLOOD PRESSURE: 118 MMHG | WEIGHT: 237 LBS | HEART RATE: 90 BPM | OXYGEN SATURATION: 98 % | DIASTOLIC BLOOD PRESSURE: 80 MMHG | BODY MASS INDEX: 33.05 KG/M2

## 2024-06-12 DIAGNOSIS — I15.9 SECONDARY HYPERTENSION: Primary | ICD-10-CM

## 2024-06-12 PROCEDURE — 3008F BODY MASS INDEX DOCD: CPT | Performed by: INTERNAL MEDICINE

## 2024-06-12 PROCEDURE — 3079F DIAST BP 80-89 MM HG: CPT | Performed by: INTERNAL MEDICINE

## 2024-06-12 PROCEDURE — 99213 OFFICE O/P EST LOW 20 MIN: CPT | Performed by: INTERNAL MEDICINE

## 2024-06-12 PROCEDURE — 3074F SYST BP LT 130 MM HG: CPT | Performed by: INTERNAL MEDICINE

## 2024-06-12 NOTE — PATIENT INSTRUCTIONS
Paroxysmal hypertensive urgency, evaluate for secondary hypertension  Primary hypertension  Continue current medication regimen including lisinopril 20 mg daily and amlodipine 2.5 mg daily  24-hour urine for evaluation of pheochromocytoma  Maintain low-salt diet  Limit alcohol as doing  Maintain regular exercise routine with goal of 120-150 minutes/week    Hypothyroidism  Continue current levothyroxine dosing    Coronary arteriosclerosis  Hyperlipidemia  Continue rosuvastatin 10 mg daily and aspirin 81 mg daily    Follow-up  Office visit in August  (Lab work is ordered for completion prior to visit)

## 2024-06-12 NOTE — PROGRESS NOTES
"Subjective   Patient ID: Gilberto Hebert is a 53 y.o. male who presents for Follow-up.    Emergency department follow-up    Patient presented to the emergency department with symptoms of dizziness and feeling \"off balance\".  He also noted racing heart.  In the hospital emergency department, he was noted to be hypertensive with systolic blood pressure in the 180s.  He has had a similar episode in the past which also required ED evaluation.  ED provider concerned about possible alcohol withdrawal, though patient did not have any persistent symptoms to suggest delirium tremens after discharge from ED.  In the emergency department, he was given 1 dose of diazepam which helped with his symptoms and lower blood pressure.  He was discharged home on same antihypertensive therapy, lisinopril 20 mg and amlodipine 2.5 mg daily  Given paroxysmal hypertensive urgency, we have discussed evaluation for secondary hypertension.  Home blood pressure readings have been doing better: 129/84, 130/87, 120/79, 120/77.    Hypothyroidism  Unintentional weight gain or loss.  No unintentional weight gain or loss.    Coronary arteriosclerosis  Hyperlipidemia  No exertional chest pain or shortness of breath.           Review of Systems   Constitutional:  Negative for diaphoresis and fatigue.   Respiratory:  Negative for shortness of breath.    Cardiovascular:  Negative for chest pain, palpitations and leg swelling.   Gastrointestinal:  Negative for constipation and diarrhea.   Neurological:  Negative for dizziness and weakness.       Objective   /80 (BP Location: Left arm, Patient Position: Sitting, BP Cuff Size: Adult)   Pulse 90   Wt 108 kg (237 lb)   SpO2 98%   BMI 33.05 kg/m²     Physical Exam  Vitals reviewed.   HENT:      Mouth/Throat:      Mouth: Mucous membranes are moist.   Eyes:      Conjunctiva/sclera: Conjunctivae normal.   Cardiovascular:      Rate and Rhythm: Normal rate and regular rhythm.      Heart sounds: No murmur " heard.  Pulmonary:      Effort: Pulmonary effort is normal.      Breath sounds: Normal breath sounds.   Abdominal:      General: Bowel sounds are normal.      Tenderness: There is no abdominal tenderness.   Musculoskeletal:      Right lower leg: No edema.      Left lower leg: No edema.   Neurological:      Mental Status: He is alert.         Assessment/Plan     Paroxysmal hypertensive urgency, evaluate for secondary hypertension  Primary hypertension  Continue current medication regimen including lisinopril 20 mg daily and amlodipine 2.5 mg daily  24-hour urine for evaluation of pheochromocytoma  Maintain low-salt diet  Limit alcohol as doing  Maintain regular exercise routine with goal of 120-150 minutes/week    Hypothyroidism  Continue current levothyroxine dosing    Coronary arteriosclerosis  Hyperlipidemia  Continue rosuvastatin 10 mg daily and aspirin 81 mg daily    Follow-up  Office visit in August  (Lab work is ordered for completion prior to visit)    Guille Mccarthy MD

## 2024-06-15 LAB
ATRIAL RATE: 94 BPM
P AXIS: 58 DEGREES
P OFFSET: 183 MS
P ONSET: 133 MS
PR INTERVAL: 152 MS
Q ONSET: 209 MS
QRS COUNT: 16 BEATS
QRS DURATION: 86 MS
QT INTERVAL: 360 MS
QTC CALCULATION(BAZETT): 450 MS
QTC FREDERICIA: 418 MS
R AXIS: 1 DEGREES
T AXIS: 46 DEGREES
T OFFSET: 389 MS
VENTRICULAR RATE: 94 BPM

## 2024-06-15 ASSESSMENT — ENCOUNTER SYMPTOMS
FATIGUE: 0
DIZZINESS: 0
PALPITATIONS: 0
DIARRHEA: 0
SHORTNESS OF BREATH: 0
WEAKNESS: 0
CONSTIPATION: 0
DIAPHORESIS: 0

## 2024-08-13 ENCOUNTER — APPOINTMENT (OUTPATIENT)
Dept: PRIMARY CARE | Facility: CLINIC | Age: 54
End: 2024-08-13
Payer: COMMERCIAL

## 2024-10-31 ENCOUNTER — APPOINTMENT (OUTPATIENT)
Dept: PRIMARY CARE | Facility: CLINIC | Age: 54
End: 2024-10-31
Payer: COMMERCIAL

## 2024-11-07 ENCOUNTER — APPOINTMENT (OUTPATIENT)
Dept: PRIMARY CARE | Facility: CLINIC | Age: 54
End: 2024-11-07
Payer: COMMERCIAL

## 2024-11-18 ENCOUNTER — APPOINTMENT (OUTPATIENT)
Dept: PRIMARY CARE | Facility: CLINIC | Age: 54
End: 2024-11-18
Payer: COMMERCIAL

## 2024-11-25 ENCOUNTER — LAB (OUTPATIENT)
Dept: LAB | Facility: LAB | Age: 54
End: 2024-11-25
Payer: COMMERCIAL

## 2024-11-25 DIAGNOSIS — I10 ESSENTIAL HYPERTENSION: ICD-10-CM

## 2024-11-25 DIAGNOSIS — E03.9 HYPOTHYROIDISM (ACQUIRED): ICD-10-CM

## 2024-11-25 LAB
ANION GAP SERPL CALC-SCNC: 13 MMOL/L (ref 10–20)
BUN SERPL-MCNC: 17 MG/DL (ref 6–23)
CALCIUM SERPL-MCNC: 10 MG/DL (ref 8.6–10.6)
CHLORIDE SERPL-SCNC: 102 MMOL/L (ref 98–107)
CO2 SERPL-SCNC: 28 MMOL/L (ref 21–32)
CREAT SERPL-MCNC: 0.74 MG/DL (ref 0.5–1.3)
EGFRCR SERPLBLD CKD-EPI 2021: >90 ML/MIN/1.73M*2
GLUCOSE SERPL-MCNC: 88 MG/DL (ref 74–99)
POTASSIUM SERPL-SCNC: 4.5 MMOL/L (ref 3.5–5.3)
SODIUM SERPL-SCNC: 138 MMOL/L (ref 136–145)
TSH SERPL-ACNC: 3.08 MIU/L (ref 0.44–3.98)

## 2024-11-25 PROCEDURE — 36415 COLL VENOUS BLD VENIPUNCTURE: CPT

## 2024-11-25 PROCEDURE — 84443 ASSAY THYROID STIM HORMONE: CPT

## 2024-11-25 PROCEDURE — 80048 BASIC METABOLIC PNL TOTAL CA: CPT

## 2024-11-27 ENCOUNTER — APPOINTMENT (OUTPATIENT)
Dept: PRIMARY CARE | Facility: CLINIC | Age: 54
End: 2024-11-27
Payer: COMMERCIAL

## 2024-11-27 VITALS
WEIGHT: 234 LBS | DIASTOLIC BLOOD PRESSURE: 72 MMHG | OXYGEN SATURATION: 95 % | SYSTOLIC BLOOD PRESSURE: 118 MMHG | HEART RATE: 94 BPM | BODY MASS INDEX: 32.64 KG/M2

## 2024-11-27 DIAGNOSIS — I10 ESSENTIAL HYPERTENSION: Primary | ICD-10-CM

## 2024-11-27 DIAGNOSIS — E78.2 MIXED HYPERLIPIDEMIA: ICD-10-CM

## 2024-11-27 DIAGNOSIS — I25.10 CORONARY ARTERIOSCLEROSIS: ICD-10-CM

## 2024-11-27 DIAGNOSIS — Z23 FLU VACCINE NEED: ICD-10-CM

## 2024-11-27 DIAGNOSIS — E03.9 HYPOTHYROIDISM (ACQUIRED): ICD-10-CM

## 2024-11-27 DIAGNOSIS — Z00.00 WELLNESS EXAMINATION: Primary | ICD-10-CM

## 2024-11-27 DIAGNOSIS — E66.09 CLASS 1 OBESITY DUE TO EXCESS CALORIES WITHOUT SERIOUS COMORBIDITY WITH BODY MASS INDEX (BMI) OF 33.0 TO 33.9 IN ADULT: ICD-10-CM

## 2024-11-27 DIAGNOSIS — E66.811 CLASS 1 OBESITY DUE TO EXCESS CALORIES WITHOUT SERIOUS COMORBIDITY WITH BODY MASS INDEX (BMI) OF 33.0 TO 33.9 IN ADULT: ICD-10-CM

## 2024-11-27 PROCEDURE — 99213 OFFICE O/P EST LOW 20 MIN: CPT | Performed by: INTERNAL MEDICINE

## 2024-11-27 PROCEDURE — 1036F TOBACCO NON-USER: CPT | Performed by: INTERNAL MEDICINE

## 2024-11-27 PROCEDURE — 90656 IIV3 VACC NO PRSV 0.5 ML IM: CPT | Performed by: INTERNAL MEDICINE

## 2024-11-27 PROCEDURE — 3074F SYST BP LT 130 MM HG: CPT | Performed by: INTERNAL MEDICINE

## 2024-11-27 PROCEDURE — 90471 IMMUNIZATION ADMIN: CPT | Performed by: INTERNAL MEDICINE

## 2024-11-27 PROCEDURE — 3078F DIAST BP <80 MM HG: CPT | Performed by: INTERNAL MEDICINE

## 2024-11-27 ASSESSMENT — ENCOUNTER SYMPTOMS
DIARRHEA: 0
CONSTIPATION: 0
SHORTNESS OF BREATH: 0
NERVOUS/ANXIOUS: 0
COUGH: 0
PALPITATIONS: 0
FATIGUE: 0
LIGHT-HEADEDNESS: 0
DYSPHORIC MOOD: 0
DIZZINESS: 0

## 2024-11-27 NOTE — PROGRESS NOTES
Subjective   Patient ID: Gilberto Hebert is a 54 y.o. male who presents for Follow-up.    Primary hypertension  Patient is feeling well.  Blood pressure today is at goal.  He is remaining active.  No chest pain, shortness of breath, palpitations.  He has not completed 24-hour urine for secondary hypertension/pheochromocytoma evaluation     Hypothyroidism  TSH is at goal.  No unintentional weight gain or loss, diarrhea or constipation     Coronary arteriosclerosis  Hyperlipidemia  Walking for exercise.  Remains on same medication.    Medical obesity (BMI 32.6)  Patient has previously lost weight, though regained in recent weeks having moved into a new home and not able to adhere to same diet  Personal goal for weight is 215 pounds  Lifestyle measures including proper diet and routine exercise reviewed.    Health maintenance  Influenza vaccine given today  Second Shingrix vaccine recommended  COVID-19 vaccine discussed    Social  Family doing well.  Work going well.  2 daughters are doing well, 1 in [facility] and the other will be starting law school next year  Recently moved into new home.         Review of Systems   Constitutional:  Negative for fatigue.   Respiratory:  Negative for cough and shortness of breath.    Cardiovascular:  Negative for chest pain, palpitations and leg swelling.   Gastrointestinal:  Negative for constipation and diarrhea.   Neurological:  Negative for dizziness and light-headedness.   Psychiatric/Behavioral:  Negative for dysphoric mood. The patient is not nervous/anxious.        Objective   /72 (BP Location: Left arm, Patient Position: Sitting, BP Cuff Size: Adult)   Pulse 94   Wt 106 kg (234 lb)   SpO2 95%   BMI 32.64 kg/m²     Physical Exam  Vitals reviewed.   HENT:      Head: Normocephalic.      Mouth/Throat:      Mouth: Mucous membranes are moist.   Eyes:      Conjunctiva/sclera: Conjunctivae normal.   Cardiovascular:      Rate and Rhythm: Normal rate and regular rhythm.       Pulses: Normal pulses.      Heart sounds: No murmur heard.  Pulmonary:      Effort: Pulmonary effort is normal.   Abdominal:      General: Bowel sounds are normal.      Palpations: Abdomen is soft.   Musculoskeletal:      Right lower leg: No edema.      Left lower leg: No edema.   Neurological:      General: No focal deficit present.      Mental Status: He is alert and oriented to person, place, and time.         Assessment/Plan     Primary hypertension  Continue current medication regimen including lisinopril 20 mg daily and amlodipine 2.5 mg daily  24-hour urine for evaluation of pheochromocytoma  Maintain low-salt diet  Limit alcohol as doing  Maintain regular exercise routine with goal of 120-150 minutes/week     Hypothyroidism  Continue current levothyroxine dosing     Coronary arteriosclerosis  Hyperlipidemia  Continue rosuvastatin 10 mg daily and aspirin 81 mg daily    Medical obesity (BMI 32.6)  Lifestyle measures including regular aerobic exercise with goal 120-150 minutes/week recommended  Well-balanced, Mediterranean type diet recommended  Goal weight 215 pounds    Health maintenance  Influenza vaccine given today  Second Shingrix vaccine recommended  COVID-19 vaccine discussed    Follow-up  Wellness exam/physical in April 2025  (Fasting lab work will be ordered to complete 3 to 5 days prior)    Guille Mccarthy MD

## 2024-11-27 NOTE — PATIENT INSTRUCTIONS
Primary hypertension  Continue current medication regimen including lisinopril 20 mg daily and amlodipine 2.5 mg daily  24-hour urine for evaluation of pheochromocytoma  Maintain low-salt diet  Limit alcohol as doing  Maintain regular exercise routine with goal of 120-150 minutes/week     Hypothyroidism  Continue current levothyroxine dosing     Coronary arteriosclerosis  Hyperlipidemia  Continue rosuvastatin 10 mg daily and aspirin 81 mg daily    Medical obesity (BMI 32.6)  Lifestyle measures including regular aerobic exercise with goal 120-150 minutes/week recommended  Well-balanced, Mediterranean type diet recommended  Goal weight 215 pounds    Health maintenance  Influenza vaccine given today  Second Shingrix vaccine recommended  COVID-19 vaccine discussed    Follow-up  Wellness exam/physical in April 2025  (Fasting lab work will be ordered to complete 3 to 5 days prior)

## 2024-12-02 ENCOUNTER — PATIENT MESSAGE (OUTPATIENT)
Dept: PRIMARY CARE | Facility: CLINIC | Age: 54
End: 2024-12-02

## 2024-12-02 ENCOUNTER — OFFICE VISIT (OUTPATIENT)
Dept: PRIMARY CARE | Facility: CLINIC | Age: 54
End: 2024-12-02
Payer: COMMERCIAL

## 2024-12-02 VITALS
HEART RATE: 84 BPM | OXYGEN SATURATION: 96 % | TEMPERATURE: 98.1 F | DIASTOLIC BLOOD PRESSURE: 80 MMHG | SYSTOLIC BLOOD PRESSURE: 124 MMHG

## 2024-12-02 DIAGNOSIS — J06.9 UPPER RESPIRATORY TRACT INFECTION, UNSPECIFIED TYPE: Primary | ICD-10-CM

## 2024-12-02 DIAGNOSIS — J01.90 ACUTE SINUSITIS, RECURRENCE NOT SPECIFIED, UNSPECIFIED LOCATION: ICD-10-CM

## 2024-12-02 LAB
FLUAV RNA RESP QL NAA+PROBE: NOT DETECTED
FLUBV RNA RESP QL NAA+PROBE: NOT DETECTED

## 2024-12-02 PROCEDURE — 99213 OFFICE O/P EST LOW 20 MIN: CPT | Performed by: INTERNAL MEDICINE

## 2024-12-02 PROCEDURE — 3079F DIAST BP 80-89 MM HG: CPT | Performed by: INTERNAL MEDICINE

## 2024-12-02 PROCEDURE — 3074F SYST BP LT 130 MM HG: CPT | Performed by: INTERNAL MEDICINE

## 2024-12-02 PROCEDURE — 1036F TOBACCO NON-USER: CPT | Performed by: INTERNAL MEDICINE

## 2024-12-02 PROCEDURE — 87636 SARSCOV2 & INF A&B AMP PRB: CPT

## 2024-12-02 RX ORDER — AZITHROMYCIN 250 MG/1
TABLET, FILM COATED ORAL
Qty: 6 TABLET | Refills: 0 | Status: SHIPPED | OUTPATIENT
Start: 2024-12-02 | End: 2024-12-07

## 2024-12-02 ASSESSMENT — ENCOUNTER SYMPTOMS
HEADACHES: 1
RHINORRHEA: 1
DIZZINESS: 0
WHEEZING: 0
COUGH: 1
FATIGUE: 1
CHILLS: 1
MYALGIAS: 1
SINUS PRESSURE: 1
SINUS PAIN: 1
FEVER: 0
SHORTNESS OF BREATH: 0

## 2024-12-02 NOTE — PROGRESS NOTES
Subjective   Patient ID: Gilberto Hebert is a 54 y.o. male who presents for URI (Sinus/chest congestion, cough, rhinitis).    Same-day visit/sick visit    Patient was with family over the Thanksgiving holiday.  His daughter arrived home from school with upper respiratory symptoms.  Patient himself developed symptoms on Friday 11/29 which have been progressive.  He has had chills, myalgias, sinus pressure and pain, yellow purulent nasal discharge and postnasal drainage, cough.  No fever, shortness of breath.    No COVID-19 or influenza testing by patient or his daughter.  [facility] after returning to school, though no testing done for patient report.  She is on antibiotic treatment.           Review of Systems   Constitutional:  Positive for chills and fatigue. Negative for fever.   HENT:  Positive for hearing loss, postnasal drip, rhinorrhea, sinus pressure and sinus pain. Negative for ear pain.    Respiratory:  Positive for cough. Negative for shortness of breath and wheezing.    Cardiovascular:  Negative for chest pain.   Musculoskeletal:  Positive for myalgias.   Neurological:  Positive for headaches. Negative for dizziness.       Objective   /80 (BP Location: Left arm, Patient Position: Sitting, BP Cuff Size: Adult)   Pulse 84   Temp 36.7 °C (98.1 °F)   SpO2 96%     Physical Exam  Vitals reviewed.   HENT:      Head:      Comments: Maxillary sinus tenderness with percussion     Right Ear: Tympanic membrane normal.      Left Ear: Tympanic membrane normal.      Mouth/Throat:      Mouth: Mucous membranes are moist.   Eyes:      Conjunctiva/sclera: Conjunctivae normal.   Cardiovascular:      Rate and Rhythm: Normal rate and regular rhythm.   Pulmonary:      Effort: Pulmonary effort is normal.      Breath sounds: Rhonchi present. No wheezing or rales.   Musculoskeletal:      Right lower leg: No edema.      Left lower leg: No edema.   Lymphadenopathy:      Cervical: No  cervical adenopathy.   Neurological:      Mental Status: He is alert.         Assessment/Plan     Upper respiratory infection  Potential early bacterial sinusitis  Nasal swab for influenza A/B and COVID-19 PCR testing  Azithromycin as Z-Neo (use as directed)  Supportive care recommended includes:  Saline nasal spray, 2 sprays in each nostril every 2 hours as needed/desired  Over-the-counter DayQuil/NyQuil as needed/as desired (monitor blood pressure while on treatment)  Robitussin DM, use as directed as desired/as needed    Follow-up  As needed    Guille Mccarthy MD

## 2024-12-02 NOTE — PATIENT INSTRUCTIONS
Upper respiratory infection  Potential early bacterial sinusitis  Nasal swab for influenza A/B and COVID-19 PCR testing  Azithromycin as Z-Neo (use as directed)  Supportive care recommended includes:  Saline nasal spray, 2 sprays in each nostril every 2 hours as needed/desired  Over-the-counter DayQuil/NyQuil as needed/as desired (monitor blood pressure while on treatment)  Robitussin DM, use as directed as desired/as needed    Follow-up  As needed

## 2024-12-03 LAB — SARS-COV-2 ORF1AB RESP QL NAA+PROBE: NOT DETECTED

## 2025-02-13 ENCOUNTER — APPOINTMENT (OUTPATIENT)
Dept: PRIMARY CARE | Facility: CLINIC | Age: 55
End: 2025-02-13
Payer: COMMERCIAL

## 2025-05-06 ENCOUNTER — APPOINTMENT (OUTPATIENT)
Dept: PRIMARY CARE | Facility: CLINIC | Age: 55
End: 2025-05-06
Payer: COMMERCIAL

## 2025-05-13 DIAGNOSIS — I10 BENIGN ESSENTIAL HYPERTENSION: ICD-10-CM

## 2025-05-13 RX ORDER — LISINOPRIL 20 MG/1
20 TABLET ORAL DAILY
Qty: 30 TABLET | Refills: 1 | Status: SHIPPED | OUTPATIENT
Start: 2025-05-13

## 2025-05-13 RX ORDER — AMLODIPINE BESYLATE 2.5 MG/1
2.5 TABLET ORAL DAILY
Qty: 30 TABLET | Refills: 1 | Status: SHIPPED | OUTPATIENT
Start: 2025-05-13

## 2025-06-10 DIAGNOSIS — E03.9 HYPOTHYROIDISM (ACQUIRED): ICD-10-CM

## 2025-06-10 RX ORDER — LEVOTHYROXINE SODIUM 88 UG/1
88 TABLET ORAL
Qty: 30 TABLET | Refills: 11 | Status: SHIPPED | OUTPATIENT
Start: 2025-06-10

## 2025-06-18 DIAGNOSIS — E03.9 HYPOTHYROIDISM (ACQUIRED): ICD-10-CM

## 2025-06-18 RX ORDER — LEVOTHYROXINE SODIUM 88 UG/1
88 TABLET ORAL
Qty: 90 TABLET | Refills: 3 | Status: SHIPPED | OUTPATIENT
Start: 2025-06-18

## 2025-06-18 NOTE — TELEPHONE ENCOUNTER
Levothyroxine 88 mcg daily 90 day supply pended for [facility].  I called and canceled previous rx that was sent to [facility] in [place] ([phone]).  Please sign.  Thank you

## 2025-06-20 ENCOUNTER — LAB (OUTPATIENT)
Dept: LAB | Facility: HOSPITAL | Age: 55
End: 2025-06-20
Payer: COMMERCIAL

## 2025-06-20 DIAGNOSIS — Z00.00 ENCOUNTER FOR GENERAL ADULT MEDICAL EXAMINATION WITHOUT ABNORMAL FINDINGS: Primary | ICD-10-CM

## 2025-06-20 DIAGNOSIS — Z00.00 WELLNESS EXAMINATION: ICD-10-CM

## 2025-06-20 LAB
25(OH)D3 SERPL-MCNC: 40 NG/ML (ref 30–100)
ALBUMIN SERPL BCP-MCNC: 5 G/DL (ref 3.4–5)
ALP SERPL-CCNC: 57 U/L (ref 33–120)
ALT SERPL W P-5'-P-CCNC: 33 U/L (ref 10–52)
ANION GAP SERPL CALC-SCNC: 17 MMOL/L (ref 10–20)
APPEARANCE UR: CLEAR
AST SERPL W P-5'-P-CCNC: 29 U/L (ref 9–39)
BASOPHILS # BLD AUTO: 0.04 X10*3/UL (ref 0–0.1)
BASOPHILS NFR BLD AUTO: 0.8 %
BILIRUB SERPL-MCNC: 0.7 MG/DL (ref 0–1.2)
BILIRUB UR STRIP.AUTO-MCNC: NEGATIVE MG/DL
BUN SERPL-MCNC: 18 MG/DL (ref 6–23)
CALCIUM SERPL-MCNC: 10.3 MG/DL (ref 8.6–10.6)
CHLORIDE SERPL-SCNC: 98 MMOL/L (ref 98–107)
CHOLEST SERPL-MCNC: 165 MG/DL (ref 0–199)
CHOLESTEROL/HDL RATIO: 2.2
CO2 SERPL-SCNC: 27 MMOL/L (ref 21–32)
COLOR UR: YELLOW
CREAT SERPL-MCNC: 0.74 MG/DL (ref 0.5–1.3)
CRP SERPL HS-MCNC: 2.3 MG/L
EGFRCR SERPLBLD CKD-EPI 2021: >90 ML/MIN/1.73M*2
EOSINOPHIL # BLD AUTO: 0.23 X10*3/UL (ref 0–0.7)
EOSINOPHIL NFR BLD AUTO: 4.7 %
ERYTHROCYTE [DISTWIDTH] IN BLOOD BY AUTOMATED COUNT: 12.2 % (ref 11.5–14.5)
EST. AVERAGE GLUCOSE BLD GHB EST-MCNC: 103 MG/DL
GLUCOSE SERPL-MCNC: 84 MG/DL (ref 74–99)
GLUCOSE UR STRIP.AUTO-MCNC: NORMAL MG/DL
HBA1C MFR BLD: 5.2 % (ref ?–5.7)
HCT VFR BLD AUTO: 48.4 % (ref 41–52)
HDLC SERPL-MCNC: 74.6 MG/DL
HGB BLD-MCNC: 15.7 G/DL (ref 13.5–17.5)
HOLD SPECIMEN: NORMAL
IMM GRANULOCYTES # BLD AUTO: 0.03 X10*3/UL (ref 0–0.7)
IMM GRANULOCYTES NFR BLD AUTO: 0.6 % (ref 0–0.9)
KETONES UR STRIP.AUTO-MCNC: NEGATIVE MG/DL
LDLC SERPL CALC-MCNC: 70 MG/DL
LEUKOCYTE ESTERASE UR QL STRIP.AUTO: NEGATIVE
LYMPHOCYTES # BLD AUTO: 1.77 X10*3/UL (ref 1.2–4.8)
LYMPHOCYTES NFR BLD AUTO: 35.8 %
MCH RBC QN AUTO: 31.9 PG (ref 26–34)
MCHC RBC AUTO-ENTMCNC: 32.4 G/DL (ref 32–36)
MCV RBC AUTO: 98 FL (ref 80–100)
MONOCYTES # BLD AUTO: 0.43 X10*3/UL (ref 0.1–1)
MONOCYTES NFR BLD AUTO: 8.7 %
NEUTROPHILS # BLD AUTO: 2.44 X10*3/UL (ref 1.2–7.7)
NEUTROPHILS NFR BLD AUTO: 49.4 %
NITRITE UR QL STRIP.AUTO: NEGATIVE
NON HDL CHOLESTEROL: 90 MG/DL (ref 0–149)
NRBC BLD-RTO: 0 /100 WBCS (ref 0–0)
PH UR STRIP.AUTO: 6.5 [PH]
PLATELET # BLD AUTO: 212 X10*3/UL (ref 150–450)
POTASSIUM SERPL-SCNC: 4.9 MMOL/L (ref 3.5–5.3)
PROT SERPL-MCNC: 7.5 G/DL (ref 6.4–8.2)
PROT UR STRIP.AUTO-MCNC: NEGATIVE MG/DL
PSA SERPL-MCNC: 1.61 NG/ML
RBC # BLD AUTO: 4.92 X10*6/UL (ref 4.5–5.9)
RBC # UR STRIP.AUTO: NEGATIVE MG/DL
SODIUM SERPL-SCNC: 137 MMOL/L (ref 136–145)
SP GR UR STRIP.AUTO: 1.02
TRIGL SERPL-MCNC: 102 MG/DL (ref 0–149)
TSH SERPL-ACNC: 6.47 MIU/L (ref 0.44–3.98)
UROBILINOGEN UR STRIP.AUTO-MCNC: NORMAL MG/DL
VLDL: 20 MG/DL (ref 0–40)
WBC # BLD AUTO: 4.9 X10*3/UL (ref 4.4–11.3)

## 2025-06-23 LAB
HOLD SPECIMEN: NORMAL

## 2025-06-26 ENCOUNTER — APPOINTMENT (OUTPATIENT)
Dept: PRIMARY CARE | Facility: CLINIC | Age: 55
End: 2025-06-26
Payer: COMMERCIAL

## 2025-06-26 VITALS
DIASTOLIC BLOOD PRESSURE: 76 MMHG | SYSTOLIC BLOOD PRESSURE: 118 MMHG | OXYGEN SATURATION: 98 % | HEIGHT: 71 IN | HEART RATE: 75 BPM | WEIGHT: 238 LBS | BODY MASS INDEX: 33.32 KG/M2

## 2025-06-26 DIAGNOSIS — E66.09 CLASS 1 OBESITY DUE TO EXCESS CALORIES WITHOUT SERIOUS COMORBIDITY WITH BODY MASS INDEX (BMI) OF 33.0 TO 33.9 IN ADULT: ICD-10-CM

## 2025-06-26 DIAGNOSIS — Z86.0100 HISTORY OF COLON POLYPS: ICD-10-CM

## 2025-06-26 DIAGNOSIS — E66.811 CLASS 1 OBESITY DUE TO EXCESS CALORIES WITHOUT SERIOUS COMORBIDITY WITH BODY MASS INDEX (BMI) OF 33.0 TO 33.9 IN ADULT: ICD-10-CM

## 2025-06-26 DIAGNOSIS — E78.2 MIXED HYPERLIPIDEMIA: ICD-10-CM

## 2025-06-26 DIAGNOSIS — Z12.11 COLON CANCER SCREENING: ICD-10-CM

## 2025-06-26 DIAGNOSIS — Z00.00 WELLNESS EXAMINATION: Primary | ICD-10-CM

## 2025-06-26 DIAGNOSIS — E03.9 HYPOTHYROIDISM (ACQUIRED): ICD-10-CM

## 2025-06-26 DIAGNOSIS — I10 ESSENTIAL HYPERTENSION: ICD-10-CM

## 2025-06-26 DIAGNOSIS — Z80.0 FAMILY HISTORY OF COLON CANCER: ICD-10-CM

## 2025-06-26 DIAGNOSIS — I25.10 CORONARY ARTERIOSCLEROSIS: ICD-10-CM

## 2025-06-26 PROCEDURE — 3078F DIAST BP <80 MM HG: CPT | Performed by: INTERNAL MEDICINE

## 2025-06-26 PROCEDURE — 1036F TOBACCO NON-USER: CPT | Performed by: INTERNAL MEDICINE

## 2025-06-26 PROCEDURE — 3008F BODY MASS INDEX DOCD: CPT | Performed by: INTERNAL MEDICINE

## 2025-06-26 PROCEDURE — UHSPHYS PR UH SELECT PHYSICAL: Performed by: INTERNAL MEDICINE

## 2025-06-26 PROCEDURE — 3074F SYST BP LT 130 MM HG: CPT | Performed by: INTERNAL MEDICINE

## 2025-06-26 PROCEDURE — 93000 ELECTROCARDIOGRAM COMPLETE: CPT | Performed by: INTERNAL MEDICINE

## 2025-06-26 RX ORDER — BISACODYL 5 MG/1
1 TABLET, COATED ORAL DAILY
Qty: 30 TABLET | Refills: 11 | OUTPATIENT
Start: 2025-06-26 | End: 2026-06-26

## 2025-06-26 ASSESSMENT — ENCOUNTER SYMPTOMS
DIZZINESS: 0
DYSPHORIC MOOD: 0
FATIGUE: 0
SHORTNESS OF BREATH: 0
BACK PAIN: 0
DIARRHEA: 0
PALPITATIONS: 0
FREQUENCY: 0
DYSURIA: 0
UNEXPECTED WEIGHT CHANGE: 0
NERVOUS/ANXIOUS: 0
RHINORRHEA: 0
ABDOMINAL PAIN: 0
COUGH: 0
HEADACHES: 0
CONSTIPATION: 0
ARTHRALGIAS: 0
SORE THROAT: 0

## 2025-06-26 NOTE — PROGRESS NOTES
Subjective   Patient ID: Gilberto Hebert is a 54 y.o. male who presents for Annual Exam.    Wellness exam/physical    Essential hypertension  Home blood pressure readings have been at goal per patient report   No headaches, dizziness, chest pain, shortness of breath  Current therapy with lisinopril and amlodipine    Coronary arteriosclerosis  Hyperlipidemia  Walking for exercise without any symptoms of chest pain, shortness of breath.  Remains on rosuvastatin and aspirin therapy  Lipid panel is at goal.    Medical obesity  Lifestyle measures reviewed.  Meeting exercise requirements, though has opportunity with diet including reduced total calories/alcohol intake.    Hypothyroidism   No overt symptoms of underactive thyroid.  TSH slightly elevated, though patient was out of medicine for 10 days in the weeks leading up to current lab work (refill had run out).  Currently on levothyroxine 88 mcg daily.    Colon cancer screening  Family history of colon cancer/personal history of colon polyps  Most recent colonoscopy with Dr. Sampson completed in 2/2021, hyperplastic polyp removed  Next colonoscopy due in February 2026, though patient would like to pursue by October given he will be in [place] for the next 7 months    Health maintenance  Exercise: Walking 30 minutes daily, tennis twice a week, golf as well.  Colon cancer screening: See above  Optometry: Due for exam  Dentistry: Up-to-date         Review of Systems   Constitutional:  Negative for fatigue and unexpected weight change.   HENT:  Negative for postnasal drip, rhinorrhea and sore throat.    Eyes:  Negative for visual disturbance.   Respiratory:  Negative for cough and shortness of breath.    Cardiovascular:  Negative for chest pain, palpitations and leg swelling.   Gastrointestinal:  Negative for abdominal pain, constipation and diarrhea.   Genitourinary:  Negative for dysuria, frequency and urgency.   Musculoskeletal:  Negative for arthralgias and back pain.  "  Neurological:  Negative for dizziness and headaches.   Psychiatric/Behavioral:  Negative for dysphoric mood. The patient is not nervous/anxious.        Objective   /76 (BP Location: Left arm, Patient Position: Sitting, BP Cuff Size: Adult)   Pulse 75   Ht 1.803 m (5' 11\")   Wt 108 kg (238 lb)   SpO2 98%   BMI 33.19 kg/m²     Physical Exam  Vitals reviewed.   Constitutional:       Appearance: Normal appearance.   HENT:      Head: Normocephalic.      Right Ear: Tympanic membrane normal.      Left Ear: Tympanic membrane normal.      Mouth/Throat:      Mouth: Mucous membranes are moist.      Pharynx: No oropharyngeal exudate or posterior oropharyngeal erythema.   Eyes:      Conjunctiva/sclera: Conjunctivae normal.   Neck:      Vascular: No carotid bruit.   Cardiovascular:      Rate and Rhythm: Normal rate and regular rhythm.      Pulses: Normal pulses.      Heart sounds: No murmur heard.  Pulmonary:      Effort: Pulmonary effort is normal.      Breath sounds: Normal breath sounds. No wheezing or rales.   Abdominal:      General: Bowel sounds are normal.      Palpations: Abdomen is soft.      Tenderness: There is no abdominal tenderness.   Genitourinary:     Prostate: Normal.   Musculoskeletal:         General: Normal range of motion.      Right lower leg: No edema.      Left lower leg: No edema.   Lymphadenopathy:      Cervical: No cervical adenopathy.   Skin:     General: Skin is warm.   Neurological:      General: No focal deficit present.      Mental Status: He is alert and oriented to person, place, and time.   Psychiatric:         Mood and Affect: Mood normal.         Assessment/Plan     Wellness exam/physical  Regular exercise with goal of 120-150 minutes/week recommended  Well-balanced diet rich in fruits, vegetables, fiber, lean protein recommended  Routine follow-up with optometry recommended, patient to schedule  Continue routine follow-up with dentistry recommended  Annual influenza vaccine " recommended in September  Second Shingrix vaccine discussed  Referral to T3 training for assessment    Essential hypertension  Stable.  Continue lisinopril and amlodipine  Maintain low-salt diet, regular exercise routine  Monitor home blood pressures once or twice a month.  Bring readings and device to next visit    Coronary arteriosclerosis  Hyperlipidemia  Continue healthy diet, regular exercise.  Continue rosuvastatin 10 mg daily and aspirin 81 mg daily    Medical obesity (BMI 33.2)  Weight loss of 20 pounds recommended  Lifestyle measures including low carbohydrate/low sugar diet as well as regular aerobic exercise recommended    Hypothyroidism (elevated TSH likely secondary to recently missed doses of levothyroxine-while awaiting refill)  Maintain current levothyroxine dosing at 88 mcg daily  Repeat thyroid lab work prior to next visit    Colon cancer screening  Family history of colon cancer/personal history of colon polyps  Screening colonoscopy due in 2/2026 which patient will likely move up to be done prior to winter in [place]  Scheduling phone number is [phone].  Dr. Omero Gomez.    Follow-up  1.  Office visit in September  (TSH lab work to be done prior to visit)  2.  Wellness exam/physical in 1 year    Guille Mccarthy MD

## 2025-06-26 NOTE — PATIENT INSTRUCTIONS
Wellness exam/physical  Regular exercise with goal of 120-150 minutes/week recommended  Well-balanced diet rich in fruits, vegetables, fiber, lean protein recommended  Routine follow-up with optometry recommended, patient to schedule  Continue routine follow-up with dentistry recommended  Annual influenza vaccine recommended in September  Second Shingrix vaccine discussed    Essential hypertension  Stable.  Continue lisinopril and amlodipine  Maintain low-salt diet, regular exercise routine  Monitor home blood pressures once or twice a month.  Bring readings and device to next visit    Coronary arteriosclerosis  Hyperlipidemia  Continue healthy diet, regular exercise.  Continue rosuvastatin 10 mg daily and aspirin 81 mg daily    Medical obesity (BMI 33.2)  Weight loss of 20 pounds recommended  Lifestyle measures including low carbohydrate/low sugar diet as well as regular aerobic exercise recommended    Hypothyroidism (elevated TSH likely secondary to recently missed doses of levothyroxine-while awaiting refill)  Maintain current levothyroxine dosing at 88 mcg daily  Repeat thyroid lab work prior to next visit    Colon cancer screening  Family history of colon cancer/personal history of colon polyps  Screening colonoscopy due in 2/2026 which patient will likely move up to be done prior to winter in [place]  Scheduling phone number is [phone].  Dr. Omero Gomez.    Follow-up  1.  Office visit in September  (TSH lab work to be done prior to visit)  2.  Wellness exam/physical in 1 year

## 2025-07-07 ENCOUNTER — PATIENT MESSAGE (OUTPATIENT)
Dept: PRIMARY CARE | Facility: CLINIC | Age: 55
End: 2025-07-07
Payer: COMMERCIAL

## 2025-07-08 DIAGNOSIS — Z12.11 SPECIAL SCREENING FOR MALIGNANT NEOPLASMS, COLON: ICD-10-CM

## 2025-07-09 RX ORDER — POLYETHYLENE GLYCOL 3350, SODIUM SULFATE ANHYDROUS, SODIUM BICARBONATE, SODIUM CHLORIDE, POTASSIUM CHLORIDE 236; 22.74; 6.74; 5.86; 2.97 G/4L; G/4L; G/4L; G/4L; G/4L
4 POWDER, FOR SOLUTION ORAL ONCE
Qty: 4000 ML | Refills: 0 | Status: SHIPPED | OUTPATIENT
Start: 2025-07-09 | End: 2025-07-09

## 2025-07-17 ENCOUNTER — PATIENT MESSAGE (OUTPATIENT)
Dept: PRIMARY CARE | Facility: CLINIC | Age: 55
End: 2025-07-17
Payer: COMMERCIAL

## 2025-07-17 DIAGNOSIS — I10 BENIGN ESSENTIAL HYPERTENSION: ICD-10-CM

## 2025-07-18 DIAGNOSIS — I10 BENIGN ESSENTIAL HYPERTENSION: ICD-10-CM

## 2025-07-18 RX ORDER — AMLODIPINE BESYLATE 2.5 MG/1
2.5 TABLET ORAL DAILY
Qty: 90 TABLET | Refills: 3 | OUTPATIENT
Start: 2025-07-18

## 2025-07-18 RX ORDER — AMLODIPINE BESYLATE 2.5 MG/1
2.5 TABLET ORAL
Qty: 90 TABLET | Refills: 1 | Status: SHIPPED | OUTPATIENT
Start: 2025-07-18

## 2025-07-18 RX ORDER — LISINOPRIL 20 MG/1
20 TABLET ORAL
Qty: 90 TABLET | Refills: 1 | Status: SHIPPED | OUTPATIENT
Start: 2025-07-18

## 2025-09-18 ENCOUNTER — APPOINTMENT (OUTPATIENT)
Dept: PRIMARY CARE | Facility: CLINIC | Age: 55
End: 2025-09-18
Payer: COMMERCIAL

## 2025-09-19 ENCOUNTER — APPOINTMENT (OUTPATIENT)
Dept: GASTROENTEROLOGY | Facility: EXTERNAL LOCATION | Age: 55
End: 2025-09-19
Payer: COMMERCIAL

## 2026-06-24 ENCOUNTER — APPOINTMENT (OUTPATIENT)
Dept: PRIMARY CARE | Facility: CLINIC | Age: 56
End: 2026-06-24
Payer: COMMERCIAL